# Patient Record
Sex: MALE | Race: WHITE | NOT HISPANIC OR LATINO | Employment: FULL TIME | URBAN - METROPOLITAN AREA
[De-identification: names, ages, dates, MRNs, and addresses within clinical notes are randomized per-mention and may not be internally consistent; named-entity substitution may affect disease eponyms.]

---

## 2024-06-21 ENCOUNTER — APPOINTMENT (OUTPATIENT)
Dept: CARDIOLOGY | Facility: CLINIC | Age: 49
End: 2024-06-21
Payer: COMMERCIAL

## 2024-06-21 VITALS
BODY MASS INDEX: 26.85 KG/M2 | SYSTOLIC BLOOD PRESSURE: 118 MMHG | HEIGHT: 74 IN | DIASTOLIC BLOOD PRESSURE: 72 MMHG | HEART RATE: 54 BPM | WEIGHT: 209.2 LBS | TEMPERATURE: 97.9 F

## 2024-06-21 DIAGNOSIS — I48.0 PAROXYSMAL ATRIAL FIBRILLATION (MULTI): Primary | ICD-10-CM

## 2024-06-21 PROCEDURE — 99213 OFFICE O/P EST LOW 20 MIN: CPT | Performed by: INTERNAL MEDICINE

## 2024-06-21 PROCEDURE — 1036F TOBACCO NON-USER: CPT | Performed by: INTERNAL MEDICINE

## 2024-06-21 NOTE — PROGRESS NOTES
"  Subjective:  The patient is a 48-year-old white male, followed primarily by Dr. Jessica Butcher, who presents for follow-up of paroxysmal atrial fibrillation.  He has been in good health, with no hypertension, diabetes, or known coronary disease.  He wore a Holter monitor in September 2022 showing up to 16 beats of atrial tachycardia but no true fibrillation.  He wears a Fitbit, which reports that he can have heart rates up to 169 bpm while sleeping.  I had him wear a 2-week event monitor in March 2023, showing some definite runs of atrial fibrillation, but never lasting more than 31 seconds in duration.  Over the past year, he feels that these have become more frequent, and sometimes cause lightheadedness.  He is anxious to have the problem treated definitively if possible.  He is not on any AV louie blocking drugs, anticoagulant therapy, or antiarrhythmic therapy.    The patient's history is also remarkable for borderline hyperlipidemia and prior gout.  He works in information technology as a consultant, and is physically active, jogging several days weekly.  He has 2 children, ages 12 and 11, and is a .  He has a girlfriend presently but does not plan to get remarried.     Allergies:  Patient has no known allergies.      Objective:  Vitals:    06/21/24 1128   BP: 118/72   Pulse: 54   Temp: 36.6 °C (97.9 °F)   Height:     1.88 m (6' 2\")  Weight: 94.9 kg (209 lb 3.2 oz)     Exam:  Gen: Pleasant gentleman in no distress; alert and oriented.  HEENT: No scleral icterus; wearing glasses.  Neck: No jugular venous distention or thyromegaly.  Lungs: Clear to auscultation, with no wheezes or rales.  Heart: Regular rhythm without extrasystoles, murmurs or gallops.  Abdomen: Benign, with no organomegaly or masses.  Extremities: Intact distal pulses; no edema.  Neuro: No focal neurologic abnormalities.  Skin: No cutaneous lesions.    Impressions:  1.  Paroxysmal atrial fibrillation, well-documented by event " monitoring in March 2023, though of uncertain etiology.  It is possible that he has vaguely-induced atrial fibrillation from his aerobic activity such as regular jogging.  He does not wish to be on long-term pharmacologic therapy for this problem if possible.  2.  Borderline hyperlipidemia, on dietary management.  3.  Remote gout.    Recommendations:  1.  The patient will be referred to Dr. Ollie Leroy for consideration of pulmonary vein isolation, as he is an excellent candidate for this procedure.  2.  The patient is aware that he will need to be anticoagulated with a drug such as Eliquis for 3 to 4 weeks prior to his ablation, and I could provide drug samples from the office if he desires.  3.  If he undergoes PVI, he could likely be weaned off Eliquis fairly quickly and then undergo another event monitor several months thereafter.      Gregory Velez MD

## 2024-07-03 PROBLEM — R00.2 PALPITATIONS: Status: ACTIVE | Noted: 2023-03-15

## 2024-07-03 RX ORDER — INDOMETHACIN 50 MG/1
50 CAPSULE ORAL
COMMUNITY
Start: 2023-09-27

## 2024-07-05 ENCOUNTER — OFFICE VISIT (OUTPATIENT)
Dept: CARDIOLOGY | Facility: HOSPITAL | Age: 49
End: 2024-07-05
Payer: COMMERCIAL

## 2024-07-05 VITALS
HEIGHT: 75 IN | BODY MASS INDEX: 25.61 KG/M2 | DIASTOLIC BLOOD PRESSURE: 72 MMHG | SYSTOLIC BLOOD PRESSURE: 105 MMHG | WEIGHT: 206 LBS | HEART RATE: 100 BPM | OXYGEN SATURATION: 98 %

## 2024-07-05 DIAGNOSIS — I48.0 PAROXYSMAL ATRIAL FIBRILLATION (MULTI): ICD-10-CM

## 2024-07-05 PROCEDURE — 1036F TOBACCO NON-USER: CPT | Performed by: INTERNAL MEDICINE

## 2024-07-05 PROCEDURE — 93005 ELECTROCARDIOGRAM TRACING: CPT | Performed by: INTERNAL MEDICINE

## 2024-07-05 PROCEDURE — 99214 OFFICE O/P EST MOD 30 MIN: CPT | Performed by: INTERNAL MEDICINE

## 2024-07-05 PROCEDURE — 99204 OFFICE O/P NEW MOD 45 MIN: CPT | Performed by: INTERNAL MEDICINE

## 2024-07-05 ASSESSMENT — ENCOUNTER SYMPTOMS
LOSS OF SENSATION IN FEET: 0
DEPRESSION: 0
OCCASIONAL FEELINGS OF UNSTEADINESS: 0

## 2024-07-05 ASSESSMENT — PAIN SCALES - GENERAL: PAINLEVEL: 0-NO PAIN

## 2024-07-05 ASSESSMENT — COLUMBIA-SUICIDE SEVERITY RATING SCALE - C-SSRS
2. HAVE YOU ACTUALLY HAD ANY THOUGHTS OF KILLING YOURSELF?: NO
6. HAVE YOU EVER DONE ANYTHING, STARTED TO DO ANYTHING, OR PREPARED TO DO ANYTHING TO END YOUR LIFE?: NO
1. IN THE PAST MONTH, HAVE YOU WISHED YOU WERE DEAD OR WISHED YOU COULD GO TO SLEEP AND NOT WAKE UP?: NO

## 2024-07-05 ASSESSMENT — PATIENT HEALTH QUESTIONNAIRE - PHQ9
2. FEELING DOWN, DEPRESSED OR HOPELESS: NOT AT ALL
1. LITTLE INTEREST OR PLEASURE IN DOING THINGS: NOT AT ALL
SUM OF ALL RESPONSES TO PHQ9 QUESTIONS 1 AND 2: 0

## 2024-07-05 NOTE — PROGRESS NOTES
Referring Provider: Gregory Velez MD  Reason for Consult: Atrial fibrillation    History of Present Illness:      Walt Menon is a 48 y.o. year old male patient with a history significant for paroxysmal atrial fibrillation who is referred by Dr. Velez for consideration for A-fib ablation.    He has no other known medical history.  He was originally diagnosed with atrial fibrillation in 2023.  He has been having palpitations since the 90s, however these episodes were always brief and self-limited.  In 2022, he had a Holter monitor that showed up to 16 beats of atrial tachycardia but no true atrial fibrillation.  However, on his Fitbit, he had episodes where his heart rates went up to 169 bpm especially while sleeping.  In March 2023 he had a 2-week event monitor which showed some runs of atrial fibrillation but nothing more than 31 seconds in duration.  These have become progressively more frequent.  More recently, he has been going into atrial fibrillation, which usually last for about 24 hours until it spontaneously terminates.  He is quite symptomatic when he is in atrial fibrillation, noting palpitations, as well as decreased exercise tolerance.  He has noted that some triggers include hot sauce and pasta.  He rarely drinks alcohol, about once a week.  He is not currently on any medication.  He is interested in A-fib ablation.      Focused Cardiovascular Problem List:  Paroxysmal atrial fibrillation: [id number] equals 0.      Past Medical and Surgical History:  Mr. Menon  has no past medical history on file.    has no past surgical history on file.    Social History:  Social History     Tobacco Use    Smoking status: Never    Smokeless tobacco: Never   Substance Use Topics    Alcohol use: Yes      Tobacco: Denies  Alcohol: Social  Drug use:   Denies  Occupational History: Works in IT  Other: Lives at lives at home with children.  .    Relevant Family History:   No  "relevant family history     Allergies:  No Known Allergies     Medications:  Current Outpatient Medications   Medication Instructions    indomethacin (INDOCIN) 50 mg, oral    rivaroxaban (XARELTO) 20 mg, oral, Daily         Objective   Physical Exam:  Last Recorded Vitals:      3/3/2023    10:20 AM 6/21/2024    11:28 AM 7/5/2024    10:02 AM   Vitals   Systolic 115 118 105   Diastolic 63 72 72   Heart Rate 58 54 100   Temp 37 °C (98.6 °F) 36.6 °C (97.9 °F)    Height (in) 1.88 m (6' 2\") 1.88 m (6' 2\") 1.905 m (6' 3\")   Weight (lb) 203 209.2 206   BMI 26.06 kg/m2 26.86 kg/m2 25.75 kg/m2   BSA (m2) 2.19 m2 2.23 m2 2.22 m2   Visit Report  Report Report    Visit Vitals  /72 (BP Location: Left arm, Patient Position: Sitting)   Pulse 100   Ht 1.905 m (6' 3\")   Wt 93.4 kg (206 lb)   SpO2 98%   BMI 25.75 kg/m²   Smoking Status Never   BSA 2.22 m²      Gen: NAD, sitting comfortably  HEENT: NC/AT  Card: Irregularly irregular, no murmurs rubs or gallops  Pulm: Clear to auscultation bilaterally  Ext: No LE edema  Neuro: No focal deficits    Diagnostic Results      My Interpretation of Reviewed Study(s):  Prior ECGs (reviewed and my interpretation):   7/5/2024: Atrial fibrillation, heart rate 99 bpm, aberrantly conducted complexes      Echocardiography:  7/5/2024: Normal LV ejection fraction, normal left atrial size, normal right atrial size      Relevant Labs:  No results found for: \"CREATININE\", \"CCL\", \"K\", \"HGBA1C\", \"HGB\", \"INR\", \"AST\", \"ALT\"    Assessment/Plan   Assessment and Plan:  In summary, patient is a pleasant 48 y.o.male with a history of recurrent symptomatic paroxysmal atrial fibrillation diagnosed in 2023, has been maintained off medication, who presents today for initial consultation. Symptoms include palpitations and inability to exercise when in atrial fibrillation. Treatment options of atrial fibrillation were discussed with the patient and all questions were answered. These options included: 1. Rate " control drug therapy with AVN blockers to slow down the heart rate, 2. Rhythm control with anti-arrhythmic drugs and with cardioversion to restore normal sinus rhythm, and/or 3. Radiofrequency (RF) ablation. In particular, RF ablation of atrial fibrillation was discussed in detail.    We discussed the potential benefits of AF ablation including freedom from AF without any medical therapy or, in some cases, a significant improvement in AF burden on medical therapy. We also discussed that AF ablation has been shown to be the most effective way to maintain a normal rhythm. Normal rhythm is associated with less heart failure hospitalizations, development of congestive heart failure, major adverse cardiac events, and cognitive dysfunction.  Furthermore, early treatment of atrial fibrillation with ablation prior to structural changes in the heart can prevent future progression to persistent atrial fibrillation and further cardiac remodeling.    The success rate for catheter ablation for AF, meaning no recurrence of any atrial fibrillation, is currently approximately 70-80% for paroxysmal atrial fibrillation. A minority of patients may require an additional touch-up procedure. However, it is highly successful at reducing overall A-fib burden as well as symptomatic recurrences. The risks of the procedure were also extensively discussed, including but not limited to infection, blood vessel damage, heart injury or perforation necessitating emergency cardiac surgery, heart attack or stroke, atrial-esophageal fistula, pulmonary vein stenosis, phrenic nerve injury, even death.      Finally, the risk of stroke associated with atrial fibrillation was discussed - as the patient has a [id number] score of   0, the patient will be maintained on Xarelto for 1 month prior and 3 months after ablation for CVA prevention.  After 3 months, Xarelto can be discontinued.  If Xarelto is too expensive based on insurance, we can try Eliquis as  well.    After extensive discussion, Mr. Menon wishes to proceed with AF ablation.    Name: Walt Menon  MRN: [id number]  Attending: Ollie Leroy MD  Procedure: AF ablation  Date desired: 8/12/2024  Diagnosis: Paroxysmal atrial fibrillation  Vendor if applicable: [employer]  Expected anesthesia: General  Isolation/precautions?:  None  Other comments/med instructions: Continue anticoagulation including on the day of the procedure.        Return to Clinic:  After ablation    Thank you very much for allowing me to participate in the care of this patient. Please do not hesitate to contact me with any further questions or concerns.    Ollie Leroy MD  Clinical Cardiac Electrophysiologist, [facility] [place]    of Medicine, [facility]  Director of Atrial Fibrillation Ablation, [facility]  Director of Ventricular Arrhythmias Research, [facility]  Office Phone Number: [phone]

## 2024-07-08 LAB
ATRIAL RATE: 394 BPM
Q ONSET: 214 MS
QRS COUNT: 16 BEATS
QRS DURATION: 90 MS
QT INTERVAL: 340 MS
QTC CALCULATION(BAZETT): 436 MS
QTC FREDERICIA: 401 MS
R AXIS: 44 DEGREES
T AXIS: 36 DEGREES
T OFFSET: 384 MS
VENTRICULAR RATE: 99 BPM

## 2024-08-12 ENCOUNTER — HOSPITAL ENCOUNTER (OUTPATIENT)
Facility: HOSPITAL | Age: 49
Setting detail: OUTPATIENT SURGERY
Discharge: HOME | End: 2024-08-12
Attending: INTERNAL MEDICINE | Admitting: INTERNAL MEDICINE
Payer: COMMERCIAL

## 2024-08-12 ENCOUNTER — APPOINTMENT (OUTPATIENT)
Dept: CARDIOLOGY | Facility: HOSPITAL | Age: 49
End: 2024-08-12
Payer: COMMERCIAL

## 2024-08-12 ENCOUNTER — ANESTHESIA (OUTPATIENT)
Dept: CARDIOLOGY | Facility: HOSPITAL | Age: 49
End: 2024-08-12
Payer: COMMERCIAL

## 2024-08-12 ENCOUNTER — ANESTHESIA EVENT (OUTPATIENT)
Dept: CARDIOLOGY | Facility: HOSPITAL | Age: 49
End: 2024-08-12
Payer: COMMERCIAL

## 2024-08-12 ENCOUNTER — HOSPITAL ENCOUNTER (OUTPATIENT)
Dept: CARDIOLOGY | Facility: HOSPITAL | Age: 49
Setting detail: OUTPATIENT SURGERY
Discharge: HOME | End: 2024-08-12
Payer: COMMERCIAL

## 2024-08-12 VITALS
WEIGHT: 216.05 LBS | BODY MASS INDEX: 27.73 KG/M2 | TEMPERATURE: 96.8 F | SYSTOLIC BLOOD PRESSURE: 113 MMHG | HEIGHT: 74 IN | DIASTOLIC BLOOD PRESSURE: 69 MMHG | OXYGEN SATURATION: 98 % | HEART RATE: 66 BPM | RESPIRATION RATE: 22 BRPM

## 2024-08-12 DIAGNOSIS — I48.0 PAROXYSMAL ATRIAL FIBRILLATION (MULTI): Primary | ICD-10-CM

## 2024-08-12 LAB
ABO GROUP (TYPE) IN BLOOD: NORMAL
ABO GROUP (TYPE) IN BLOOD: NORMAL
ACT BLD: 163 SEC (ref 89–169)
ACT BLD: 389 SEC (ref 89–169)
ALBUMIN SERPL BCP-MCNC: 4.1 G/DL (ref 3.4–5)
ALP SERPL-CCNC: 40 U/L (ref 33–120)
ALT SERPL W P-5'-P-CCNC: 34 U/L (ref 10–52)
ANION GAP SERPL CALC-SCNC: 9 MMOL/L (ref 10–20)
ANTIBODY SCREEN: NORMAL
APTT PPP: 40 SECONDS (ref 27–38)
AST SERPL W P-5'-P-CCNC: 25 U/L (ref 9–39)
ATRIAL RATE: 52 BPM
ATRIAL RATE: 59 BPM
BILIRUB SERPL-MCNC: 0.5 MG/DL (ref 0–1.2)
BUN SERPL-MCNC: 18 MG/DL (ref 6–23)
CALCIUM SERPL-MCNC: 8.7 MG/DL (ref 8.6–10.3)
CHLORIDE SERPL-SCNC: 109 MMOL/L (ref 98–107)
CO2 SERPL-SCNC: 25 MMOL/L (ref 21–32)
CREAT SERPL-MCNC: 0.91 MG/DL (ref 0.5–1.3)
EGFRCR SERPLBLD CKD-EPI 2021: >90 ML/MIN/1.73M*2
ERYTHROCYTE [DISTWIDTH] IN BLOOD BY AUTOMATED COUNT: 12.9 % (ref 11.5–14.5)
GLUCOSE SERPL-MCNC: 89 MG/DL (ref 74–99)
HCT VFR BLD AUTO: 44.8 % (ref 41–52)
HGB BLD-MCNC: 15.2 G/DL (ref 13.5–17.5)
INR PPP: 1.2 (ref 0.9–1.1)
MCH RBC QN AUTO: 30.4 PG (ref 26–34)
MCHC RBC AUTO-ENTMCNC: 33.9 G/DL (ref 32–36)
MCV RBC AUTO: 90 FL (ref 80–100)
NRBC BLD-RTO: 0 /100 WBCS (ref 0–0)
P AXIS: 44 DEGREES
P AXIS: 61 DEGREES
P OFFSET: 209 MS
P OFFSET: 215 MS
P ONSET: 148 MS
P ONSET: 155 MS
PLATELET # BLD AUTO: 201 X10*3/UL (ref 150–450)
POTASSIUM SERPL-SCNC: 3.9 MMOL/L (ref 3.5–5.3)
PR INTERVAL: 138 MS
PR INTERVAL: 154 MS
PROT SERPL-MCNC: 6.7 G/DL (ref 6.4–8.2)
PROTHROMBIN TIME: 13.9 SECONDS (ref 9.8–12.8)
Q ONSET: 224 MS
Q ONSET: 225 MS
QRS COUNT: 10 BEATS
QRS COUNT: 8 BEATS
QRS DURATION: 94 MS
QRS DURATION: 98 MS
QT INTERVAL: 436 MS
QT INTERVAL: 452 MS
QTC CALCULATION(BAZETT): 420 MS
QTC CALCULATION(BAZETT): 431 MS
QTC FREDERICIA: 430 MS
QTC FREDERICIA: 433 MS
R AXIS: 20 DEGREES
R AXIS: 23 DEGREES
RBC # BLD AUTO: 5 X10*6/UL (ref 4.5–5.9)
RH FACTOR (ANTIGEN D): NORMAL
RH FACTOR (ANTIGEN D): NORMAL
SODIUM SERPL-SCNC: 139 MMOL/L (ref 136–145)
T AXIS: 21 DEGREES
T AXIS: 31 DEGREES
T OFFSET: 442 MS
T OFFSET: 451 MS
VENTRICULAR RATE: 52 BPM
VENTRICULAR RATE: 59 BPM
WBC # BLD AUTO: 6.7 X10*3/UL (ref 4.4–11.3)

## 2024-08-12 PROCEDURE — 2500000004 HC RX 250 GENERAL PHARMACY W/ HCPCS (ALT 636 FOR OP/ED): Performed by: ANESTHESIOLOGY

## 2024-08-12 PROCEDURE — G0269 OCCLUSIVE DEVICE IN VEIN ART: HCPCS | Performed by: INTERNAL MEDICINE

## 2024-08-12 PROCEDURE — 7100000010 HC PHASE TWO TIME - EACH INCREMENTAL 1 MINUTE: Performed by: INTERNAL MEDICINE

## 2024-08-12 PROCEDURE — C1769 GUIDE WIRE: HCPCS | Performed by: INTERNAL MEDICINE

## 2024-08-12 PROCEDURE — 2780000003 HC OR 278 NO HCPCS: Performed by: INTERNAL MEDICINE

## 2024-08-12 PROCEDURE — 93656 COMPRE EP EVAL ABLTJ ATR FIB: CPT | Performed by: INTERNAL MEDICINE

## 2024-08-12 PROCEDURE — C1732 CATH, EP, DIAG/ABL, 3D/VECT: HCPCS | Performed by: INTERNAL MEDICINE

## 2024-08-12 PROCEDURE — 7100000009 HC PHASE TWO TIME - INITIAL BASE CHARGE: Performed by: INTERNAL MEDICINE

## 2024-08-12 PROCEDURE — 86901 BLOOD TYPING SEROLOGIC RH(D): CPT | Performed by: NURSE PRACTITIONER

## 2024-08-12 PROCEDURE — C1730 CATH, EP, 19 OR FEW ELECT: HCPCS | Performed by: INTERNAL MEDICINE

## 2024-08-12 PROCEDURE — C1760 CLOSURE DEV, VASC: HCPCS | Performed by: INTERNAL MEDICINE

## 2024-08-12 PROCEDURE — 85610 PROTHROMBIN TIME: CPT | Performed by: NURSE PRACTITIONER

## 2024-08-12 PROCEDURE — 3700000001 HC GENERAL ANESTHESIA TIME - INITIAL BASE CHARGE: Performed by: INTERNAL MEDICINE

## 2024-08-12 PROCEDURE — 2720000007 HC OR 272 NO HCPCS: Performed by: INTERNAL MEDICINE

## 2024-08-12 PROCEDURE — 80053 COMPREHEN METABOLIC PANEL: CPT | Performed by: NURSE PRACTITIONER

## 2024-08-12 PROCEDURE — 85027 COMPLETE CBC AUTOMATED: CPT | Performed by: NURSE PRACTITIONER

## 2024-08-12 PROCEDURE — 93010 ELECTROCARDIOGRAM REPORT: CPT | Performed by: INTERNAL MEDICINE

## 2024-08-12 PROCEDURE — 2500000005 HC RX 250 GENERAL PHARMACY W/O HCPCS: Performed by: ANESTHESIOLOGY

## 2024-08-12 PROCEDURE — C1766 INTRO/SHEATH,STRBLE,NON-PEEL: HCPCS | Performed by: INTERNAL MEDICINE

## 2024-08-12 PROCEDURE — 3700000002 HC GENERAL ANESTHESIA TIME - EACH INCREMENTAL 1 MINUTE: Performed by: INTERNAL MEDICINE

## 2024-08-12 PROCEDURE — 36415 COLL VENOUS BLD VENIPUNCTURE: CPT | Performed by: INTERNAL MEDICINE

## 2024-08-12 PROCEDURE — 85347 COAGULATION TIME ACTIVATED: CPT | Performed by: INTERNAL MEDICINE

## 2024-08-12 PROCEDURE — 86923 COMPATIBILITY TEST ELECTRIC: CPT

## 2024-08-12 PROCEDURE — 85347 COAGULATION TIME ACTIVATED: CPT

## 2024-08-12 PROCEDURE — 76937 US GUIDE VASCULAR ACCESS: CPT | Performed by: INTERNAL MEDICINE

## 2024-08-12 PROCEDURE — 2500000004 HC RX 250 GENERAL PHARMACY W/ HCPCS (ALT 636 FOR OP/ED): Performed by: INTERNAL MEDICINE

## 2024-08-12 PROCEDURE — 36415 COLL VENOUS BLD VENIPUNCTURE: CPT | Performed by: NURSE PRACTITIONER

## 2024-08-12 PROCEDURE — 93005 ELECTROCARDIOGRAM TRACING: CPT

## 2024-08-12 PROCEDURE — 93005 ELECTROCARDIOGRAM TRACING: CPT | Mod: 59

## 2024-08-12 PROCEDURE — 2500000005 HC RX 250 GENERAL PHARMACY W/O HCPCS: Performed by: INTERNAL MEDICINE

## 2024-08-12 PROCEDURE — C1759 CATH, INTRA ECHOCARDIOGRAPHY: HCPCS | Performed by: INTERNAL MEDICINE

## 2024-08-12 RX ORDER — SUCCINYLCHOLINE CHLORIDE 100 MG/5ML
SYRINGE (ML) INTRAVENOUS AS NEEDED
Status: DISCONTINUED | OUTPATIENT
Start: 2024-08-12 | End: 2024-08-12

## 2024-08-12 RX ORDER — ONDANSETRON HYDROCHLORIDE 2 MG/ML
INJECTION, SOLUTION INTRAVENOUS AS NEEDED
Status: DISCONTINUED | OUTPATIENT
Start: 2024-08-12 | End: 2024-08-12

## 2024-08-12 RX ORDER — LIDOCAINE HCL/PF 100 MG/5ML
SYRINGE (ML) INTRAVENOUS AS NEEDED
Status: DISCONTINUED | OUTPATIENT
Start: 2024-08-12 | End: 2024-08-12

## 2024-08-12 RX ORDER — ALBUTEROL SULFATE 0.83 MG/ML
2.5 SOLUTION RESPIRATORY (INHALATION) ONCE AS NEEDED
Status: DISCONTINUED | OUTPATIENT
Start: 2024-08-12 | End: 2024-08-12

## 2024-08-12 RX ORDER — HYDROMORPHONE HYDROCHLORIDE 1 MG/ML
0.5 INJECTION, SOLUTION INTRAMUSCULAR; INTRAVENOUS; SUBCUTANEOUS EVERY 5 MIN PRN
Status: DISCONTINUED | OUTPATIENT
Start: 2024-08-12 | End: 2024-08-12

## 2024-08-12 RX ORDER — PROTAMINE SULFATE 10 MG/ML
INJECTION, SOLUTION INTRAVENOUS AS NEEDED
Status: DISCONTINUED | OUTPATIENT
Start: 2024-08-12 | End: 2024-08-12

## 2024-08-12 RX ORDER — MEPERIDINE HYDROCHLORIDE 25 MG/ML
12.5 INJECTION INTRAMUSCULAR; INTRAVENOUS; SUBCUTANEOUS EVERY 10 MIN PRN
Status: DISCONTINUED | OUTPATIENT
Start: 2024-08-12 | End: 2024-08-12

## 2024-08-12 RX ORDER — SODIUM CHLORIDE, SODIUM LACTATE, POTASSIUM CHLORIDE, CALCIUM CHLORIDE 600; 310; 30; 20 MG/100ML; MG/100ML; MG/100ML; MG/100ML
100 INJECTION, SOLUTION INTRAVENOUS CONTINUOUS
Status: DISCONTINUED | OUTPATIENT
Start: 2024-08-12 | End: 2024-08-12

## 2024-08-12 RX ORDER — DIPHENHYDRAMINE HYDROCHLORIDE 50 MG/ML
12.5 INJECTION INTRAMUSCULAR; INTRAVENOUS ONCE AS NEEDED
Status: DISCONTINUED | OUTPATIENT
Start: 2024-08-12 | End: 2024-08-12

## 2024-08-12 RX ORDER — HEPARIN SODIUM 1000 [USP'U]/ML
INJECTION, SOLUTION INTRAVENOUS; SUBCUTANEOUS AS NEEDED
Status: DISCONTINUED | OUTPATIENT
Start: 2024-08-12 | End: 2024-08-12

## 2024-08-12 RX ORDER — ROCURONIUM BROMIDE 10 MG/ML
INJECTION, SOLUTION INTRAVENOUS AS NEEDED
Status: DISCONTINUED | OUTPATIENT
Start: 2024-08-12 | End: 2024-08-12

## 2024-08-12 RX ORDER — PANTOPRAZOLE SODIUM 40 MG/1
40 TABLET, DELAYED RELEASE ORAL
Status: DISCONTINUED | OUTPATIENT
Start: 2024-08-12 | End: 2024-08-12 | Stop reason: HOSPADM

## 2024-08-12 RX ORDER — LIDOCAINE HYDROCHLORIDE 10 MG/ML
0.1 INJECTION INFILTRATION; PERINEURAL ONCE
Status: DISCONTINUED | OUTPATIENT
Start: 2024-08-12 | End: 2024-08-12

## 2024-08-12 RX ORDER — ONDANSETRON HYDROCHLORIDE 2 MG/ML
4 INJECTION, SOLUTION INTRAVENOUS ONCE AS NEEDED
Status: DISCONTINUED | OUTPATIENT
Start: 2024-08-12 | End: 2024-08-12

## 2024-08-12 RX ORDER — PANTOPRAZOLE SODIUM 40 MG/1
40 TABLET, DELAYED RELEASE ORAL
Qty: 84 TABLET | Refills: 0 | Status: SHIPPED | OUTPATIENT
Start: 2024-08-12 | End: 2024-09-23

## 2024-08-12 RX ORDER — FENTANYL CITRATE 50 UG/ML
INJECTION, SOLUTION INTRAMUSCULAR; INTRAVENOUS AS NEEDED
Status: DISCONTINUED | OUTPATIENT
Start: 2024-08-12 | End: 2024-08-12

## 2024-08-12 RX ORDER — LIDOCAINE HYDROCHLORIDE 10 MG/ML
INJECTION, SOLUTION EPIDURAL; INFILTRATION; INTRACAUDAL; PERINEURAL AS NEEDED
Status: DISCONTINUED | OUTPATIENT
Start: 2024-08-12 | End: 2024-08-12 | Stop reason: HOSPADM

## 2024-08-12 RX ORDER — OXYCODONE HYDROCHLORIDE 5 MG/1
5 TABLET ORAL EVERY 4 HOURS PRN
Status: CANCELLED | OUTPATIENT
Start: 2024-08-12

## 2024-08-12 RX ORDER — FENTANYL CITRATE 50 UG/ML
25 INJECTION, SOLUTION INTRAMUSCULAR; INTRAVENOUS EVERY 5 MIN PRN
Status: DISCONTINUED | OUTPATIENT
Start: 2024-08-12 | End: 2024-08-12

## 2024-08-12 RX ORDER — ACETAMINOPHEN 325 MG/1
650 TABLET ORAL EVERY 4 HOURS PRN
Status: CANCELLED | OUTPATIENT
Start: 2024-08-12

## 2024-08-12 RX ORDER — OXYCODONE HYDROCHLORIDE 5 MG/1
10 TABLET ORAL EVERY 4 HOURS PRN
Status: CANCELLED | OUTPATIENT
Start: 2024-08-12

## 2024-08-12 RX ORDER — PROPOFOL 10 MG/ML
INJECTION, EMULSION INTRAVENOUS AS NEEDED
Status: DISCONTINUED | OUTPATIENT
Start: 2024-08-12 | End: 2024-08-12

## 2024-08-12 RX ORDER — IBUPROFEN 400 MG/1
400 TABLET ORAL EVERY 8 HOURS PRN
Status: DISCONTINUED | OUTPATIENT
Start: 2024-08-12 | End: 2024-08-12 | Stop reason: HOSPADM

## 2024-08-12 RX ORDER — METOPROLOL TARTRATE 1 MG/ML
5 INJECTION, SOLUTION INTRAVENOUS ONCE
Status: DISCONTINUED | OUTPATIENT
Start: 2024-08-12 | End: 2024-08-12

## 2024-08-12 RX ORDER — MIDAZOLAM HYDROCHLORIDE 1 MG/ML
INJECTION, SOLUTION INTRAMUSCULAR; INTRAVENOUS AS NEEDED
Status: DISCONTINUED | OUTPATIENT
Start: 2024-08-12 | End: 2024-08-12

## 2024-08-12 RX ORDER — ACETAMINOPHEN 325 MG/1
650 TABLET ORAL EVERY 4 HOURS PRN
Status: DISCONTINUED | OUTPATIENT
Start: 2024-08-12 | End: 2024-08-12 | Stop reason: HOSPADM

## 2024-08-12 SDOH — HEALTH STABILITY: MENTAL HEALTH: CURRENT SMOKER: 0

## 2024-08-12 ASSESSMENT — PAIN SCALES - GENERAL

## 2024-08-12 ASSESSMENT — PAIN - FUNCTIONAL ASSESSMENT

## 2024-08-12 ASSESSMENT — COLUMBIA-SUICIDE SEVERITY RATING SCALE - C-SSRS
1. IN THE PAST MONTH, HAVE YOU WISHED YOU WERE DEAD OR WISHED YOU COULD GO TO SLEEP AND NOT WAKE UP?: NO
6. HAVE YOU EVER DONE ANYTHING, STARTED TO DO ANYTHING, OR PREPARED TO DO ANYTHING TO END YOUR LIFE?: NO
2. HAVE YOU ACTUALLY HAD ANY THOUGHTS OF KILLING YOURSELF?: NO

## 2024-08-12 NOTE — ANESTHESIA POSTPROCEDURE EVALUATION
Patient: Walt Menon    Procedure Summary       Date: 08/12/24 Room / Location: [facility] / [facility]    Anesthesia Start: 0745 Anesthesia Stop: 0945    Procedure: Ablation A-Fib Diagnosis:       Paroxysmal atrial fibrillation (Multi)      (Paroxysmal atrial fibrillation (Multi) [I48.0])    Providers: Ollie Leroy MD Responsible Provider: Tara Jeff MD    Anesthesia Type: general ASA Status: 3            Anesthesia Type: general    Vitals Value Taken Time   /68 08/12/24 0944   Temp 36.0 08/12/24 0946   Pulse 70 08/12/24 0944   Resp 12 08/12/24 0944   SpO2 100 % 08/12/24 0944   Vitals shown include unfiled device data.    Anesthesia Post Evaluation    Patient location during evaluation: PACU  Patient participation: complete - patient participated  Level of consciousness: sleepy but conscious  Pain management: adequate  Airway patency: patent  Cardiovascular status: acceptable, blood pressure returned to baseline and hemodynamically stable  Respiratory status: acceptable, nonlabored ventilation, spontaneous ventilation, face mask and unassisted  Hydration status: acceptable  Postoperative Nausea and Vomiting: none      There were no known notable events for this encounter.

## 2024-08-12 NOTE — POST-PROCEDURE NOTE
Physician Transition of Care Summary  Invasive Cardiovascular Lab    Procedure Date: 8/12/2024  Attending:    * Ollie Leroy - Primary  Resident/Fellow/Other Assistant: Surgeons and Role:  * No surgeons found with a matching role *    Indications:   Pre-op Diagnosis      * Paroxysmal atrial fibrillation (Multi) [I48.0]    Post-procedure diagnosis:   Post-op Diagnosis     * Paroxysmal atrial fibrillation (Multi) [I48.0]    Procedure(s):   Ablation A-Fib  99599 - OK COMPRE EP EVAL ABLTJ ATR FIB PULM VEIN ISOLATION    Paroxysmal Atrial Fibrillation Ablation     Procedures  Pulmonary Vein Isolation (17413), LA Pacing and recording (99398), 3D Mapping (43773), Transeptal Catheterization (88041), His Bundle Recording (29775), Ultrasound Guided vascular access (41834), Intracardiac Echocardiogram (58648)  Direct current cardioversion (14145)     Patient history:  Please see attached H&P     Procedure narrative:  The risks, benefits, and alternatives to the procedure were explained to the patient and informed consent was obtained. After informed written consent was obtained the patient was transported to the electrophysiology laboratory in the post absorptive, non-sedated state. The team verification process was completed prior to the start of the procedure. Written consent and a completed procedural sedation form were confirmed. Allergies/Adverse reactions were noted and patient teaching was performed. The patient was positioned on table and bilateral arm supports with soft cushions and all pressure points were padded. A large diameter grounding pad was applied to the patient's thigh. ECG electrodes and a set of hands-free defibrillator pads were applied to the patient. A MCL/12 lead ECG was continuously monitored throughout the procedure. Noninvasive blood pressure, oxygen saturation, and capnography were monitored throughout the procedure. Supplemental oxygen was delivered via nasal cannula or facemask. General  anesthesia was administered throughout the procedure by our staff anesthesiology service.  Please see their notations for intubation and sedation.  Throughout the procedure the patient's comfort (pain scale) and level of sedation (sedation scale) were noted every 5 minutes.         VENOUS ACCESS: After general anesthesia, femoral access was achieved utilizing the Seldinger technique with a cook needle under ultrasound guidance. One 8 FR sheath and one 9Fr sheath were placed in the right femoral veins. Via the right femoral vein 9Fr short sheath an ICE catheter was advanced into the mid RA for continuous ultrasound guidance and into the RVOT to evaluate for an effusion.  After access was obtained, a bolus injection of weight based heparin 150 units/kg was administered. The Activated Clotting Time maintained between 350-400 secs with additional boluses q 30 minutes as necessary.     TRANSSEPTAL ACCESS: A transseptal atrial puncture was performed using intracardiac ultrasound guidance. A long J-tip wire was passed through the superior right femoral vein 8 Fr sheath into the SVC, the 8Fr sheath was subsequently removed. A Vizigo sheath was advanced over wire into the SVC. The wire was removed and the BRK needle was advanced into the sheath. The Vizigo with the BRK needle were dragged from the SVC along the septum until engagement of the Fossa Ovalis was confirmed by interatrial tenting seen under intracardiac ultrasound guidance. The BRK needle was advanced into the left atrium  the needle position confirmed with ICE. A SafeSept wire was advanced into the LSPV. The sheath with dilator were advanced carefully over the needle into the body of the left atrium. Once the Vizigo sheath was confirmed in the left atrium via intracardiac ultrasound, the wire and dilator were removed.  The sheath was attached to pressure tubing and a 25cc/hr drip of heparinized saline maintained. The ICE catheter was exchanged for a decapolar CS  catheter if needed for pacing during the case.      MAPPING: Prior to transseptal, a right atrial shell was reconstructed using ablation catheter FAM. The His, phrenic nerve, and CS were identified using the ablation catheter on FAM. The ICE catheter was advanced into the RVOT. The epicardial space of the heart, including around the RV and LV, was evaluated and no effusion was noted. After transseptal access, a Pentaray catheter was advanced into the left atrium through the Vizigo sheath. A 3-D shell representing the left atrium and pulmonary veins was constructed by use of the [employer] Sound mapping system and catheter manipulation in the LA. The electroanatomic map revealed predominantly normal voltage indicating normal healthy myocardium in the left atrium. The mapping system was utilized to facilitate fluoroless manipulation of all catheters.     PULMONARY VEIN ISOLATION: Left atrial ablation was performed in the pulmonary vein antrum to encircle the right and left sided PVs. Wide area encirclement was performed.  After the PVI ablation was completed, both pulmonary veins were assessed for block with the ablation catheter. Both right and left pulmonary veins were found to have entrance and exit block. Radiofrequency energy was applied with maximal power of 50W and target force of 10 grams or greater. Over the posterior wall radiofrequency energy was applied with maximal power of 50W with target force of 10 grams. Ablation was continued until a CARTO SurePoint index between 400-500 was achieved. Along the posterior wall a Carto SurePoint index between 300-350 was targeted. Esophageal temperature probe was taped to a quad catheter (4Fr Maria De Jesus) so it could be visualized on CARTO, and it was maintained as close as possible to the lesion sites on the posterior wall and RF was terminated for any temperature rise greater than 1°C. Baseline temperature was 35.5C at the start of the case and increased to a peak  temperature of 36.8C . In addition, phrenic nerve location (using high output pacing) was tagged and localized on Carto over the right and left atrium (RSPV). No ablation was performed at the vicinity of the phrenic nerve. First pass isolation was achieved in the left veins, but not the right. A carinal line was performed in the right veins with subsequent isolation. Both entrance and exit block were demonstrated in the PV's.     PROVOCATION POST-PULMONARY VEIN ISOLATION: After re-confirming entrance and exit block from both pulmonary veins, rapid atrial pacing was performed from the left atrial posterior wall. No flutter was induced. Atrial fibrillation was induced, which was cardioverted with one 200J synchronized shock.      POST-ABLATION: The ICE catheter was maneuvered into the RVOT. The epicardial space of the heart, including around the RV and LV, was evaluated and no effusion was visualized. Sheaths were removed and hemostasis was obtained at the right femoral venous access sites with Perclose. Five minutes of manual compression was applied to maintain hemostasis.       Complications:  No complications occurred     Summary:  - Successful pulmonary vein isolation by radiofrequency ablation with demonstrated entrance block and exit block.         Discharge:   The patient left the EP laboratory in stable condition.   If pt doing well, groin access sites without issues pt can be discharged      Follow up:   Resume AC Xarelto 20mg Daily, Please DO NOT hold blood thinner unless emergency without asking your doctor.   PPI BID x 6 weeks  Bedrest for 2 hours post sheath removal  No driving, alcohol or making legal decisions for 24 hours.  Remove band-aid/tegaderm in 1 day.  No heavy lifting, strenuous exercise for 1 week  Please call our office if you notice any groin discharge or swelling or fever.   For cardiology electrophysiology emergencies (such as severe heart racing, bleeding, severe groin pain/swelling, high  fever, wound discharge, stroke symptoms, or recent severe chest pain) call the office     See complete procedural log and parameters.     ATTESTATION   As the responsible attending physician, I was present and directly participating in all critical portions of the procedure and immediately available during the non-critical portions.    Electronically signed by: Ollie Leroy MD, 8/12/2024 10:13 AM

## 2024-08-12 NOTE — ANESTHESIA PREPROCEDURE EVALUATION
Patient: Walt Menon    Procedure Information       Date/Time: 08/12/24 0730    Procedure: Ablation A-Fib - Labs on admit    Location: [facility] / [facility]    Providers: Ollie Leroy MD            Relevant Problems   Cardiac   (+) Paroxysmal atrial fibrillation (Multi)      GI   (+) Gastroesophageal reflux disease without esophagitis       Clinical information reviewed:   Tobacco  Allergies  Meds  Problems  Med Hx  Surg Hx   Fam Hx  Soc   Hx        NPO Detail:  NPO/Void Status  Date of Last Liquid: 08/11/24  Time of Last Liquid: 2000  Date of Last Solid: 08/11/24  Time of Last Solid: 2000  Last Intake Type: Clear fluids  Time of Last Void: 0500         Physical Exam    Airway  Mallampati: II  TM distance: >3 FB  Neck ROM: full     Cardiovascular   Rhythm: irregular     Dental    Pulmonary - normal exam     Abdominal - normal exam             Anesthesia Plan    History of general anesthesia?: yes  History of complications of general anesthesia?: no    ASA 3     general   (A-line)  The patient is not a current smoker.  Patient did not smoke on day of procedure.    intravenous induction   Anesthetic plan and risks discussed with patient.    Plan discussed with CRNA and attending.

## 2024-08-12 NOTE — SIGNIFICANT EVENT
Pt ambulated down morin to  and voided clear/yellow urine. Right groin site soft and stable with no hematoma or oozing. Pt denies dizziness/lightheadedness/pain.

## 2024-08-12 NOTE — SIGNIFICANT EVENT
Pt ambulating in hallway without difficulty, no complaints of lightheadedness or dizziness   Refill passed per [place] [facility], [facility] protocol    Requested Prescriptions   Pending Prescriptions Disp Refills    PANTOPRAZOLE 40 MG Oral Tab EC [Pharmacy Med Name: Pantoprazole Sodium Ec 40 Mg Tab Auro] 90 tablet 3     Sig: Take 1 tablet by mouth every morning before breakfast.       Gastrointestional Medication Protocol Passed - 3/19/2023  1:32 AM        Passed - In person appointment or virtual visit in the past 12 mos or appointment in next 3 mos     Recent Outpatient Visits              2 weeks ago Wellness examination    Venkat Russell MD    Office Visit    3 weeks ago Bilateral primary osteoarthritis of knee    Taya Lazar Tommas Noss, MD    Office Visit    5 years ago Sensorineural hearing loss, bilateral    Haydermily Recio, [address], Jeannette Murphy    Office Visit    5 years ago Ringing in the ear, right    Hayder Kalebjoann, [address], Paolo Clarke MD    Office Visit    5 years ago Anxiety state    Taya Lazar Jacquiline Pelt, [place]    Office Visit          Future Appointments         Provider Department Appt Notes    In 1 week [address] DEXA [address] Smetany [facility] for [facility]

## 2024-08-12 NOTE — Clinical Note
Sheath was exchanged in the right femoral vein with SHEATH, [employer], W/.038 GUIDEWIRE, 10 CM,  8FR INTRODUCER, 8FR JYOTHI, 2.5 CM DIALATOR.

## 2024-08-12 NOTE — ANESTHESIA PROCEDURE NOTES
Arterial Line:    Date/Time: 8/12/2024 7:19 AM    Staffing  Performed: attending   Authorized by: Tara Jeff MD    Performed by: Tara Jeff MD    An arterial line was placed. Procedure performed using ultrasound guidance and surface landmarks.in the pre-op for the following indication(s): continuous blood pressure monitoring and blood sampling needed.    A 20 gauge (size) (length), Angiocath (type) catheter was placed into the Left radial artery, secured by Tegaderm,   Seldinger technique used.  Events:  patient tolerated procedure well with no complications.

## 2024-08-12 NOTE — SIGNIFICANT EVENT
Discharge instructions given to pt including medications, follow up and wound care, pt verbalized understanding

## 2024-08-12 NOTE — Clinical Note
Sheath was exchanged in the right femoral vein with SHEATH, GUIDING, ADRIÁN, 8.5F WITH CURVE VIZ  MDC.

## 2024-08-12 NOTE — ANESTHESIA PROCEDURE NOTES
Airway  Date/Time: 8/12/2024 7:56 AM  Urgency: elective    Airway not difficult    Staffing  Performed: CRNA   Authorized by: Tara Jeff MD    Performed by: ALESIA Ortiz-CATRACHO  Patient location during procedure: OR    Indications and Patient Condition  Indications for airway management: anesthesia and airway protection  Spontaneous Ventilation: absent  Sedation level: deep  Preoxygenated: yes  Mask difficulty assessment: 1 - vent by mask    Final Airway Details  Final airway type: endotracheal airway      Successful airway: ETT  Cuffed: yes   Successful intubation technique: video laryngoscopy  Facilitating devices/methods: intubating stylet  Endotracheal tube insertion site: oral  Blade size: #4  ETT size (mm): 8.0  Cormack-Lehane Classification: grade I - full view of glottis  Placement verified by: chest auscultation and capnometry   Measured from: gums  ETT to gums (cm): 22  Number of attempts at approach: 1

## 2024-08-12 NOTE — SIGNIFICANT EVENT
After removing right forearm IV pt complained of some nausea, BP low, assisted back to bed, BP  came back up, notified NP, will monitor closely

## 2024-08-13 LAB
BLOOD EXPIRATION DATE: NORMAL
BLOOD EXPIRATION DATE: NORMAL
DISPENSE STATUS: NORMAL
DISPENSE STATUS: NORMAL
PRODUCT BLOOD TYPE: 6200
PRODUCT BLOOD TYPE: 6200
PRODUCT CODE: NORMAL
PRODUCT CODE: NORMAL
UNIT ABO: NORMAL
UNIT ABO: NORMAL
UNIT NUMBER: NORMAL
UNIT NUMBER: NORMAL
UNIT RH: NORMAL
UNIT RH: NORMAL
UNIT VOLUME: 350
UNIT VOLUME: 350
XM INTEP: NORMAL
XM INTEP: NORMAL

## 2024-08-14 ENCOUNTER — APPOINTMENT (OUTPATIENT)
Dept: CARDIOLOGY | Facility: HOSPITAL | Age: 49
End: 2024-08-14
Payer: COMMERCIAL

## 2024-08-14 ENCOUNTER — APPOINTMENT (OUTPATIENT)
Dept: RADIOLOGY | Facility: HOSPITAL | Age: 49
End: 2024-08-14
Payer: COMMERCIAL

## 2024-08-14 ENCOUNTER — HOSPITAL ENCOUNTER (EMERGENCY)
Facility: HOSPITAL | Age: 49
Discharge: HOME | End: 2024-08-14
Attending: EMERGENCY MEDICINE
Payer: COMMERCIAL

## 2024-08-14 VITALS
DIASTOLIC BLOOD PRESSURE: 79 MMHG | OXYGEN SATURATION: 99 % | HEART RATE: 80 BPM | HEIGHT: 74 IN | WEIGHT: 215 LBS | BODY MASS INDEX: 27.59 KG/M2 | RESPIRATION RATE: 17 BRPM | SYSTOLIC BLOOD PRESSURE: 132 MMHG | TEMPERATURE: 98.1 F

## 2024-08-14 DIAGNOSIS — R07.89 OTHER CHEST PAIN: Primary | ICD-10-CM

## 2024-08-14 LAB
ALBUMIN SERPL BCP-MCNC: 4.3 G/DL (ref 3.4–5)
ALP SERPL-CCNC: 42 U/L (ref 33–120)
ALT SERPL W P-5'-P-CCNC: 29 U/L (ref 10–52)
ANION GAP SERPL CALC-SCNC: 12 MMOL/L (ref 10–20)
AST SERPL W P-5'-P-CCNC: 27 U/L (ref 9–39)
BASOPHILS # BLD AUTO: 0.08 X10*3/UL (ref 0–0.1)
BASOPHILS NFR BLD AUTO: 0.5 %
BILIRUB SERPL-MCNC: 0.7 MG/DL (ref 0–1.2)
BNP SERPL-MCNC: 201 PG/ML (ref 0–99)
BUN SERPL-MCNC: 10 MG/DL (ref 6–23)
CALCIUM SERPL-MCNC: 9.1 MG/DL (ref 8.6–10.3)
CARDIAC TROPONIN I PNL SERPL HS: 760 NG/L (ref 0–20)
CARDIAC TROPONIN I PNL SERPL HS: 784 NG/L (ref 0–20)
CHLORIDE SERPL-SCNC: 105 MMOL/L (ref 98–107)
CO2 SERPL-SCNC: 27 MMOL/L (ref 21–32)
CREAT SERPL-MCNC: 1 MG/DL (ref 0.5–1.3)
D DIMER PPP FEU-MCNC: 267 NG/ML FEU
EGFRCR SERPLBLD CKD-EPI 2021: >90 ML/MIN/1.73M*2
EOSINOPHIL # BLD AUTO: 0.06 X10*3/UL (ref 0–0.7)
EOSINOPHIL NFR BLD AUTO: 0.4 %
ERYTHROCYTE [DISTWIDTH] IN BLOOD BY AUTOMATED COUNT: 13.3 % (ref 11.5–14.5)
GLUCOSE SERPL-MCNC: 83 MG/DL (ref 74–99)
HCT VFR BLD AUTO: 46 % (ref 41–52)
HGB BLD-MCNC: 15.2 G/DL (ref 13.5–17.5)
IMM GRANULOCYTES # BLD AUTO: 0.08 X10*3/UL (ref 0–0.7)
IMM GRANULOCYTES NFR BLD AUTO: 0.5 % (ref 0–0.9)
INR PPP: 1.3 (ref 0.9–1.1)
LYMPHOCYTES # BLD AUTO: 2.51 X10*3/UL (ref 1.2–4.8)
LYMPHOCYTES NFR BLD AUTO: 16.5 %
MCH RBC QN AUTO: 30.3 PG (ref 26–34)
MCHC RBC AUTO-ENTMCNC: 33 G/DL (ref 32–36)
MCV RBC AUTO: 92 FL (ref 80–100)
MONOCYTES # BLD AUTO: 1.56 X10*3/UL (ref 0.1–1)
MONOCYTES NFR BLD AUTO: 10.3 %
NEUTROPHILS # BLD AUTO: 10.9 X10*3/UL (ref 1.2–7.7)
NEUTROPHILS NFR BLD AUTO: 71.8 %
NRBC BLD-RTO: 0 /100 WBCS (ref 0–0)
PLATELET # BLD AUTO: 214 X10*3/UL (ref 150–450)
POTASSIUM SERPL-SCNC: 4 MMOL/L (ref 3.5–5.3)
PROT SERPL-MCNC: 7 G/DL (ref 6.4–8.2)
PROTHROMBIN TIME: 14.3 SECONDS (ref 9.8–12.8)
RBC # BLD AUTO: 5.01 X10*6/UL (ref 4.5–5.9)
SODIUM SERPL-SCNC: 140 MMOL/L (ref 136–145)
WBC # BLD AUTO: 15.2 X10*3/UL (ref 4.4–11.3)

## 2024-08-14 PROCEDURE — 93005 ELECTROCARDIOGRAM TRACING: CPT

## 2024-08-14 PROCEDURE — 84075 ASSAY ALKALINE PHOSPHATASE: CPT | Performed by: EMERGENCY MEDICINE

## 2024-08-14 PROCEDURE — 85025 COMPLETE CBC W/AUTO DIFF WBC: CPT | Performed by: EMERGENCY MEDICINE

## 2024-08-14 PROCEDURE — 83880 ASSAY OF NATRIURETIC PEPTIDE: CPT | Performed by: EMERGENCY MEDICINE

## 2024-08-14 PROCEDURE — 71275 CT ANGIOGRAPHY CHEST: CPT

## 2024-08-14 PROCEDURE — 84484 ASSAY OF TROPONIN QUANT: CPT | Performed by: EMERGENCY MEDICINE

## 2024-08-14 PROCEDURE — 71275 CT ANGIOGRAPHY CHEST: CPT | Mod: FOREIGN READ | Performed by: RADIOLOGY

## 2024-08-14 PROCEDURE — 36415 COLL VENOUS BLD VENIPUNCTURE: CPT | Performed by: EMERGENCY MEDICINE

## 2024-08-14 PROCEDURE — 71046 X-RAY EXAM CHEST 2 VIEWS: CPT

## 2024-08-14 PROCEDURE — 2550000001 HC RX 255 CONTRASTS: Performed by: EMERGENCY MEDICINE

## 2024-08-14 PROCEDURE — 85610 PROTHROMBIN TIME: CPT | Performed by: EMERGENCY MEDICINE

## 2024-08-14 PROCEDURE — 85379 FIBRIN DEGRADATION QUANT: CPT | Performed by: EMERGENCY MEDICINE

## 2024-08-14 PROCEDURE — 71046 X-RAY EXAM CHEST 2 VIEWS: CPT | Mod: FOREIGN READ | Performed by: RADIOLOGY

## 2024-08-14 PROCEDURE — 99285 EMERGENCY DEPT VISIT HI MDM: CPT

## 2024-08-14 RX ORDER — ONDANSETRON HYDROCHLORIDE 2 MG/ML
4 INJECTION, SOLUTION INTRAVENOUS ONCE
Status: DISCONTINUED | OUTPATIENT
Start: 2024-08-14 | End: 2024-08-14 | Stop reason: HOSPADM

## 2024-08-14 RX ORDER — MORPHINE SULFATE 4 MG/ML
4 INJECTION, SOLUTION INTRAMUSCULAR; INTRAVENOUS ONCE
Status: DISCONTINUED | OUTPATIENT
Start: 2024-08-14 | End: 2024-08-14 | Stop reason: HOSPADM

## 2024-08-14 ASSESSMENT — PAIN SCALES - GENERAL: PAINLEVEL_OUTOF10: 10 - WORST POSSIBLE PAIN

## 2024-08-14 ASSESSMENT — LIFESTYLE VARIABLES
HAVE PEOPLE ANNOYED YOU BY CRITICIZING YOUR DRINKING: NO
EVER FELT BAD OR GUILTY ABOUT YOUR DRINKING: NO
EVER HAD A DRINK FIRST THING IN THE MORNING TO STEADY YOUR NERVES TO GET RID OF A HANGOVER: NO
TOTAL SCORE: 0
HAVE YOU EVER FELT YOU SHOULD CUT DOWN ON YOUR DRINKING: NO

## 2024-08-14 ASSESSMENT — COLUMBIA-SUICIDE SEVERITY RATING SCALE - C-SSRS
2. HAVE YOU ACTUALLY HAD ANY THOUGHTS OF KILLING YOURSELF?: NO
1. IN THE PAST MONTH, HAVE YOU WISHED YOU WERE DEAD OR WISHED YOU COULD GO TO SLEEP AND NOT WAKE UP?: NO
6. HAVE YOU EVER DONE ANYTHING, STARTED TO DO ANYTHING, OR PREPARED TO DO ANYTHING TO END YOUR LIFE?: NO

## 2024-08-14 ASSESSMENT — PAIN DESCRIPTION - ORIENTATION: ORIENTATION: LEFT

## 2024-08-14 ASSESSMENT — PAIN DESCRIPTION - PAIN TYPE: TYPE: ACUTE PAIN

## 2024-08-14 ASSESSMENT — PAIN DESCRIPTION - LOCATION: LOCATION: CHEST

## 2024-08-14 ASSESSMENT — PAIN - FUNCTIONAL ASSESSMENT: PAIN_FUNCTIONAL_ASSESSMENT: 0-10

## 2024-08-14 NOTE — DISCHARGE INSTRUCTIONS
You are seen emergency today for chest pain.  We did perform a comprehensive workup including blood work, EKG, chest x-ray, and CT imaging.  Your troponin, a test that measures how hard you are working, was high, but after speaking with our cardiologist they believe it is due to the recent ablation.  We performed a CTA which is used to identify blood clots in your lungs and it was negative.  At this time, you will be discharged home and recommend to follow with your primary care provider and the cardiologist to perform the ablation.  Please return to the emergency room you have any chest pain, shortness of breath, or coughing up blood.

## 2024-08-14 NOTE — ED TRIAGE NOTES
Pt C/O of Cp x 1 day, pt states he had a ablasion done for his a-fib on Monday. Pt comes in toady with upper left chest pain. Pt has SOB on exertion. No other sx at this time.

## 2024-08-14 NOTE — ED PROVIDER NOTES
HPI   Chief Complaint   Patient presents with   • Chest Pain     Pt C/O of Cp x 1 day, pt states he had a ablasion done for his a-fib on Monday. Pt comes in toady with upper left chest pain. Pt has SOB on exertion. No other sx at this time.       48-year-old male who presents with chest pain.  Patient had an ablation done on Monday at [place] for A-fib.  He is currently on Xarelto.  He states he has had left-sided chest pain since the procedure its gotten worse last night.  States is worse with deep breathing.  Denies any blood clots in his legs or lungs.  Denies any fever, chills, or cough.  Patient denies any history of MI.            Patient History   Past Medical History:   Diagnosis Date   • Arrhythmia      Past Surgical History:   Procedure Laterality Date   • CARDIAC ELECTROPHYSIOLOGY PROCEDURE N/A 8/12/2024    Procedure: Ablation A-Fib;  Surgeon: Ollie Leroy MD;  Location: [facility];  Service: Electrophysiology;  Laterality: N/A;  Labs on admit     No family history on file.  Social History     Tobacco Use   • Smoking status: Never   • Smokeless tobacco: Never   Vaping Use   • Vaping status: Never Used   Substance Use Topics   • Alcohol use: Yes     Comment: rare   • Drug use: Yes     Types: Marijuana     Comment: gummies       Physical Exam   ED Triage Vitals [08/14/24 0705]   Temperature Heart Rate Respirations BP   37.2 °C (99 °F) 66 18 130/77      Pulse Ox Temp src Heart Rate Source Patient Position   99 % -- -- --      BP Location FiO2 (%)     Right arm --       Physical Exam  Constitutional:       Appearance: Normal appearance. He is normal weight.   HENT:      Head: Normocephalic and atraumatic.      Nose: Nose normal.      Mouth/Throat:      Mouth: Mucous membranes are moist.      Pharynx: Oropharynx is clear.   Eyes:      Extraocular Movements: Extraocular movements intact.      Conjunctiva/sclera: Conjunctivae normal.      Pupils: Pupils are equal, round, and reactive to light.    Cardiovascular:      Rate and Rhythm: Normal rate and regular rhythm.   Pulmonary:      Effort: Pulmonary effort is normal.      Breath sounds: Normal breath sounds.   Abdominal:      General: Abdomen is flat. Bowel sounds are normal.      Palpations: Abdomen is soft.   Musculoskeletal:         General: Normal range of motion.      Cervical back: Normal range of motion and neck supple.   Skin:     General: Skin is warm and dry.      Capillary Refill: Capillary refill takes less than 2 seconds.   Neurological:      General: No focal deficit present.      Mental Status: He is alert.   Psychiatric:         Mood and Affect: Mood normal.         Behavior: Behavior normal.         Thought Content: Thought content normal.         Judgment: Judgment normal.         ED Course & MDM   ED Course as of 08/19/24 1340   Wed Aug 14, 2024   1040 XR chest 2 views  IMPRESSION:  No acute findings.   []   1135 Troponin I, High Sensitivity(!!): 784 []   1135 Troponin I, High Sensitivity(!!): 760 []   1222 EKG interpreted by ED physician.  Normal sinus rate rhythm.  No STEMI.  QTc 418.  . [AH]   1346 CT angio chest for pulmonary embolism  IMPRESSION:  No pulmonary artery emboli.  Minimal right basilar atelectasis.  Fatty liver morphology..   []      ED Course User Index  [AH] Melody Garcia, PA-ILIANA         Diagnoses as of 08/19/24 1340   Other chest pain                 No data recorded     [place] Coma Scale Score: 15 (08/14/24 0705 : Vinnie Montemayor, EMT)                           Medical Decision Making  Amount and/or Complexity of Data Reviewed  ECG/medicine tests: independent interpretation performed.     Details: EKG shows a normal sinus rhythm at a rate of 64 with nonspecific ST-T wave changes, interpreted by ED physician.        Procedure  Procedures     Sonja Rodriguez DO  08/19/24 1341

## 2024-09-10 ENCOUNTER — APPOINTMENT (OUTPATIENT)
Dept: CARDIOLOGY | Facility: CLINIC | Age: 49
End: 2024-09-10
Payer: COMMERCIAL

## 2024-09-10 VITALS
HEIGHT: 74 IN | DIASTOLIC BLOOD PRESSURE: 80 MMHG | WEIGHT: 217 LBS | BODY MASS INDEX: 27.85 KG/M2 | HEART RATE: 59 BPM | SYSTOLIC BLOOD PRESSURE: 116 MMHG

## 2024-09-10 DIAGNOSIS — I48.0 PAROXYSMAL ATRIAL FIBRILLATION (MULTI): Primary | ICD-10-CM

## 2024-09-10 DIAGNOSIS — R07.9 CHEST PAIN, UNSPECIFIED TYPE: ICD-10-CM

## 2024-09-10 DIAGNOSIS — R00.2 PALPITATIONS: ICD-10-CM

## 2024-09-10 PROCEDURE — 99214 OFFICE O/P EST MOD 30 MIN: CPT | Performed by: NURSE PRACTITIONER

## 2024-09-10 PROCEDURE — 3008F BODY MASS INDEX DOCD: CPT | Performed by: NURSE PRACTITIONER

## 2024-09-10 PROCEDURE — 93000 ELECTROCARDIOGRAM COMPLETE: CPT | Performed by: INTERNAL MEDICINE

## 2024-09-10 RX ORDER — METOPROLOL SUCCINATE 25 MG/1
TABLET, EXTENDED RELEASE ORAL
Qty: 30 TABLET | Refills: 11 | Status: SHIPPED | OUTPATIENT
Start: 2024-09-10

## 2024-09-10 NOTE — PATIENT INSTRUCTIONS
Check your magnesium tablet:  goal 400mg daily    Start metoprolol 25 mg at bedtime.    Rosa [phone]

## 2024-09-10 NOTE — PROGRESS NOTES
"CARDIOLOGY OFFICE VISIT      CHIEF COMPLAINT  Chief Complaint   Patient presents with    Follow-up     Pt is here today following up after recent hospital visit pertaining to chest pain      Chief complaint: \"My atrial fibrillation's were since the ablation.\"  HISTORY OF PRESENT ILLNESS  HPI  History: The patient is a 48-year-old  male who is referred to Dr. Leroy for evaluation of paroxysmal atrial fibrillation.  The patient reported intermittent palpitations since the 1990s.  He underwent a Holter monitor which revealed a 16 beat run of atrial tachycardia but no true atrial fibrillation.  Patient had monitored his arrhythmia via Fitbit which revealed heart rates up to 169 bpm especially when sleeping.  He has a history of normal left ventricular function and structurally normal heart.  He underwent a PVI on August 12, 2024 and cardioversion and presents the office today for follow-up evaluation.  Review of the medical records reveals patient presented to the emergency room at [facility] on August 14, 2024 with chest pain of 1 days duration.  He also describes shortness of breath with exercise.  Troponins were elevated at 784 and 760, BNP was 201 and D-dimer was 267.  PA and lateral chest x-ray was normal and CTA was negative for PE.  A fatty liver was noted.  No changes were made to the patient's current medications.  The patient states the pain resolved in a day or 2.  He reports now that he is experiencing episodes of palpitations more frequently.  He is monitoring his episodes via his Fitbit as well.  He states that he has prolonged episodes of palpitations approximately every other day.  He denies chest pain, dizziness, lightheadedness, abdominal distention, or lower extremity edema.  He is slightly more easily fatigued and short of breath with exercise.  Past Medical History  Past Medical History:   Diagnosis Date    Arrhythmia        Social History  Social History     Tobacco Use "    Smoking status: Never    Smokeless tobacco: Never   Vaping Use    Vaping status: Never Used   Substance Use Topics    Alcohol use: Yes     Comment: rare    Drug use: Yes     Types: Marijuana     Comment: gummies       Family History     Family History   Problem Relation Name Age of Onset    Heart attack Mother      Obesity Mother          Allergies:  No Known Allergies     Outpatient Medications:  Current Outpatient Medications   Medication Instructions    indomethacin (INDOCIN) 50 mg, oral, As needed    pantoprazole (PROTONIX) 40 mg, oral, 2 times daily before meals, For 6 weeks then discontinue    rivaroxaban (XARELTO) 20 mg, oral, Daily          REVIEW OF SYSTEMS  Review of Systems   All other systems reviewed and are negative.        VITALS  Vitals:    09/10/24 0811   BP: 116/80   Pulse: 59       PHYSICAL EXAM  Vitals and nursing note reviewed.   Constitutional:       Appearance: Normal appearance.   HENT:      Head: Normocephalic.   Neck:      Vascular: No JVD. Carotid upstrokes II/IV.  Cardiovascular:      Rate and Rhythm: Normal rate and regular rhythm.      Pulses: Normal pulses.      Heart sounds: Normal S1 S2, no S3 S4.  No murmurs or rubs.  Pulmonary:      Effort: Pulmonary effort is normal. Respirations regular and nonlabored.     Breath sounds: Clear to auscultation posterior laterally.  Abdominal:      General: Bowel sounds are normal.      Palpations: Abdomen is soft.   Musculoskeletal:         General: Normal range of motion.      Cervical back: Normal range of motion.   Skin:     General: Skin is warm and dry.   Neurological:      General: No focal deficit present.      Mental Status: Alert and oriented to person, place, and time.      Motor: Motor function is intact.   Psychiatric:         Attention and Perception: Attention and perception normal.         Mood and Affect: Mood and affect normal.         Speech: Speech normal.         Behavior: Behavior normal. Behavior is cooperative.          Thought Content: Thought content normal.         Cognition and Memory: Cognition and memory normal.     Labs and testing: Twelve-lead EKG reveals sinus bradycardia at 59 bpm.  QRS duration 104 ms,  ms, QTc 425 ms.  No acute ischemic changes are noted.  2D echocardiogram dated March 15, 2023 revealed an ejection fraction of 55 to 60%, trace to mild PA, trace TR, mild MR, and mild aortic valve and mitral valve thickening.      ASSESSMENT AND PLAN    Clinical impressions:  1.  Paroxysmal atrial fibrillation status post PVI on August 12, 2024 consisting of ablation to the antrum and encircling of the right and left pulmonary veins with placement of a carinal line at the right pulmonary veins, presently on no rate or rhythm controlling medications and anticoagulated with Xarelto.  2.  Paroxysmal atrial tachycardia per Holter monitor and Fitbit.  3.  Normal left ventricular function, ejection fraction 55 to 60% per 2D echocardiogram dated March 15, 2023.  4.  History of gout on Indocin and as needed.  5.  Reported use of cannabis Gummies.  6.  Overweight, BMI 27.86.    Recommendations:  1.  We reviewed the rhythm strips from the Fitbit.  2 arrhythmias were noted: Paroxysmal atrial fibrillation and paroxysmal atrial tachycardia.  The ablation procedure was reviewed in detail.  The patient will begin metoprolol succinate 25 mg 1 tablet daily at bedtime.  He will continue all other medications as prescribed.  2.  Patient was instructed to discontinue use of the cannabis Gummies during the healing process.  3.  Continue lifestyle modifications as discussed.  4.  The patient denies a history of sleep apnea.  Consider sleep study in the future.  I discussed the correlation between sleep apnea and arrhythmia.  Patient has had episodes of arrhythmia which occurred during the nighttime hours.  5.  I will review the above information with Dr. Leroy for any additional recommendations.  6.  Obtain a Zio patch monitor on  November 12, 2024 at 11 AM as scheduled.  7.  Follow-up in office with Dr. Leroy on December 10, 2024 at 11:30 AM as scheduled or sooner if needed.  8.  Patient was provided with my direct phone number for any additional questions or concerns.    Evaluation and note by Rosa Hernandez CNP  **Please excuse any errors in grammar or translation related to this dictation.  Voice recognition software was utilized to prepare this document.**

## 2024-09-24 ENCOUNTER — TELEPHONE (OUTPATIENT)
Dept: CARDIOLOGY | Facility: CLINIC | Age: 49
End: 2024-09-24
Payer: COMMERCIAL

## 2024-09-24 NOTE — TELEPHONE ENCOUNTER
7 day Skip [id number]/[id number] does not require prior auth per Vira CAN at [employer]-call ref#[id number].

## 2024-11-12 ENCOUNTER — APPOINTMENT (OUTPATIENT)
Dept: CARDIOLOGY | Facility: CLINIC | Age: 49
End: 2024-11-12
Payer: COMMERCIAL

## 2024-11-12 DIAGNOSIS — I48.0 PAROXYSMAL ATRIAL FIBRILLATION (MULTI): ICD-10-CM

## 2024-11-26 PROCEDURE — 93248 EXT ECG>7D<15D REV&INTERPJ: CPT | Performed by: INTERNAL MEDICINE

## 2024-12-10 ENCOUNTER — APPOINTMENT (OUTPATIENT)
Dept: CARDIOLOGY | Facility: CLINIC | Age: 49
End: 2024-12-10
Payer: COMMERCIAL

## 2024-12-10 VITALS
DIASTOLIC BLOOD PRESSURE: 70 MMHG | HEIGHT: 74 IN | HEART RATE: 55 BPM | WEIGHT: 219 LBS | TEMPERATURE: 97.7 F | BODY MASS INDEX: 28.11 KG/M2 | SYSTOLIC BLOOD PRESSURE: 108 MMHG | OXYGEN SATURATION: 98 %

## 2024-12-10 DIAGNOSIS — I48.0 PAROXYSMAL ATRIAL FIBRILLATION (MULTI): Primary | ICD-10-CM

## 2024-12-10 PROCEDURE — G2211 COMPLEX E/M VISIT ADD ON: HCPCS | Performed by: INTERNAL MEDICINE

## 2024-12-10 PROCEDURE — 99213 OFFICE O/P EST LOW 20 MIN: CPT | Performed by: INTERNAL MEDICINE

## 2024-12-10 PROCEDURE — 93010 ELECTROCARDIOGRAM REPORT: CPT | Performed by: INTERNAL MEDICINE

## 2024-12-10 PROCEDURE — 3008F BODY MASS INDEX DOCD: CPT | Performed by: INTERNAL MEDICINE

## 2024-12-10 PROCEDURE — 93005 ELECTROCARDIOGRAM TRACING: CPT | Performed by: INTERNAL MEDICINE

## 2024-12-10 PROCEDURE — 1036F TOBACCO NON-USER: CPT | Performed by: INTERNAL MEDICINE

## 2024-12-10 ASSESSMENT — PATIENT HEALTH QUESTIONNAIRE - PHQ9
2. FEELING DOWN, DEPRESSED OR HOPELESS: NOT AT ALL
SUM OF ALL RESPONSES TO PHQ9 QUESTIONS 1 AND 2: 0
1. LITTLE INTEREST OR PLEASURE IN DOING THINGS: NOT AT ALL

## 2024-12-10 NOTE — PROGRESS NOTES
"  Referring Provider: No ref. provider found  Reason for Consult: Atrial fibrillation    History of Present Illness:      Walt Menon is a 48 y.o. year old male patient with a history significant for paroxysmal atrial fibrillation who is referred by Dr. Velez for consideration for A-fib ablation.    He has no other known medical history.  He was originally diagnosed with atrial fibrillation in 2023.  He has been having palpitations since the 90s, however these episodes were always brief and self-limited.  In 2022, he had a Holter monitor that showed up to 16 beats of atrial tachycardia but no true atrial fibrillation.  However, on his Fitbit, he had episodes where his heart rates went up to 169 bpm especially while sleeping.  In March 2023 he had a 2-week event monitor which showed some runs of atrial fibrillation but nothing more than 31 seconds in duration.  These have become progressively more frequent.  More recently, he has been going into atrial fibrillation, which usually last for about 24 hours until it spontaneously terminates.  He is quite symptomatic when he is in atrial fibrillation, noting palpitations, as well as decreased exercise tolerance.  He has noted that some triggers include hot sauce and pasta.  He rarely drinks alcohol, about once a week.  He is not currently on any medication.  He is interested in A-fib ablation.    After his last visit, we performed pulmonary vein isolation in August 2024.  Afterwards, at 3 months post ablation, he had a 7-day patch monitor.  This showed no recurrent atrial fibrillation but did show short episodes of paroxysmal atrial tachycardia, the longest lasting 1 minute.  He notes that he sometimes feels these occasional \"blips\" but they usually only last for a few seconds.  He otherwise feels well since the ablation.  He has stopped as per my recommendation.       Focused Cardiovascular Problem List:  Paroxysmal atrial fibrillation: [id number] equals " "0.  Paroxysmal nonsustained atrial tachycardia      Past Medical and Surgical History:  Mr. Menon  has a past medical history of Arrhythmia.    has a past surgical history that includes Cardiac electrophysiology procedure (N/A, 8/12/2024).    Social History:  Social History     Tobacco Use    Smoking status: Never    Smokeless tobacco: Never   Substance Use Topics    Alcohol use: Yes     Comment: rare      Tobacco: Denies  Alcohol: Social  Drug use:   Denies  Occupational History: Works in IT  Other: Lives at lives at home with children.  .    Relevant Family History:   No relevant family history     Allergies:  No Known Allergies     Medications:  Current Outpatient Medications   Medication Instructions    indomethacin (INDOCIN) 50 mg, oral, As needed    metoprolol succinate XL (Toprol-XL) 25 mg 24 hr tablet Take one tablet daily at bedtime.    pantoprazole (PROTONIX) 40 mg, oral, 2 times daily before meals, For 6 weeks then discontinue    rivaroxaban (XARELTO) 20 mg, oral, Daily         Objective   Physical Exam:  Last Recorded Vitals:      8/12/2024    10:20 AM 8/12/2024    10:25 AM 8/12/2024    10:30 AM 8/12/2024    10:35 AM 8/14/2024     7:05 AM 8/14/2024     2:00 PM 9/10/2024     8:11 AM   Vitals   Systolic 119 118 112 113 130 132 116   Diastolic 68 69 70 69 77 79 80   BP Location     Right arm  Left arm   Heart Rate 68 66 66 66 66 80 59   Temp  36 °C (96.8 °F)   37 °C (98.6 °F) 36.7 °C (98.1 °F)    Resp 14 16 7 22 18 17    Height     1.88 m (6' 2\")  1.88 m (6' 2\")   Weight (lb)     215  217   BMI     27.6 kg/m2  27.86 kg/m2   BSA (m2)     2.26 m2  2.27 m2   Visit Report       Report    Visit Vitals  Smoking Status Never      Gen: NAD, sitting comfortably  HEENT: NC/AT  Card: Irregularly irregular, no murmurs rubs or gallops  Pulm: Clear to auscultation bilaterally  Ext: No LE edema  Neuro: No focal deficits    Diagnostic Results      My Interpretation of Reviewed Study(s):  Prior ECGs " (reviewed and my interpretation):   7/5/2024: Atrial fibrillation, heart rate 99 bpm, aberrantly conducted complexes      Echocardiography:  7/5/2024: Normal LV ejection fraction, normal left atrial size, normal right atrial size      Relevant Labs:  Lab Results   Component Value Date    CREATININE 1.00 08/14/2024    CREATININE 0.91 08/12/2024    K 4.0 08/14/2024    K 3.9 08/12/2024    HGB 15.2 08/14/2024    HGB 15.2 08/12/2024    INR 1.3 (H) 08/14/2024    INR 1.2 (H) 08/12/2024    AST 27 08/14/2024    AST 25 08/12/2024    ALT 29 08/14/2024    ALT 34 08/12/2024       Assessment/Plan   Assessment and Plan:  In summary, patient is a pleasant 48 y.o.male with a history of recurrent symptomatic paroxysmal atrial fibrillation diagnosed in 2023, has been maintained off medication, who presents today for follow-up after pulmonary vein isolation. His 3-month patch monitor showed occasional episodes of paroxysmal atrial tachycardia, the longest lasting one minute, but the majority only lasting for a few seconds.     Recommendations:  - Would continue the low dose metoprolol XL 25mg daily for his occasional short runs of AT  - Stop PPI and Xarelto  - Repeat 7-day patch monitor one year after ablation  - Follow-up next September        Return to Clinic:  in 9 months    Thank you very much for allowing me to participate in the care of this patient. Please do not hesitate to contact me with any further questions or concerns.    Ollie Leroy MD  Clinical Cardiac Electrophysiologist, [facility] [place]    of Medicine, [facility] [employer]  Director of Atrial Fibrillation Ablation, [facility]  Director of Ventricular Arrhythmias Research, [facility]  Office Phone Number: [phone]

## 2024-12-23 DIAGNOSIS — R00.2 PALPITATIONS: ICD-10-CM

## 2024-12-23 DIAGNOSIS — I48.0 PAROXYSMAL ATRIAL FIBRILLATION (MULTI): ICD-10-CM

## 2024-12-23 RX ORDER — METOPROLOL SUCCINATE 25 MG/1
TABLET, EXTENDED RELEASE ORAL
Qty: 90 TABLET | Refills: 3 | Status: SHIPPED | OUTPATIENT
Start: 2024-12-23

## 2024-12-23 NOTE — TELEPHONE ENCOUNTER
Received refill request for metoprolol succinate 50mg daily. Order placed and sent to physician for signature.

## 2025-01-23 NOTE — H&P
History Of Present Illness  48 y.o. year old male patient with a history significant for paroxysmal atrial fibrillation who is referred by Dr. Velez for consideration for A-fib ablation.     He has no other known medical history.  He was originally diagnosed with atrial fibrillation in 2023.  He has been having palpitations since the 90s, however these episodes were always brief and self-limited.  In 2022, he had a Holter monitor that showed up to 16 beats of atrial tachycardia but no true atrial fibrillation.  However, on his Fitbit, he had episodes where his heart rates went up to 169 bpm especially while sleeping.  In March 2023 he had a 2-week event monitor which showed some runs of atrial fibrillation but nothing more than 31 seconds in duration.  These have become progressively more frequent.  More recently, he has been going into atrial fibrillation, which usually last for about 24 hours until it spontaneously terminates.  He is quite symptomatic when he is in atrial fibrillation, noting palpitations, as well as decreased exercise tolerance.  He has noted that some triggers include hot sauce and pasta.  He rarely drinks alcohol, about once a week.  He was started on Xarelto last month after his visit with me.       Past Medical History  Past Medical History:   Diagnosis Date    Arrhythmia        Surgical History  History reviewed. No pertinent surgical history.     Social History  He reports that he has never smoked. He has never used smokeless tobacco. He reports current alcohol use. He reports current drug use. Drug: Marijuana.    Family History  No family history on file.     Allergies  Patient has no known allergies.    Review of Systems   All other systems reviewed and are negative.       Physical Exam  Gen: NAD, sitting comfortably  HEENT: NC/AT  Card: RRR, no m/r/g  Pulm: Normal work of breathing  Ext: No LE edema  Neuro: No focal deficits       Last Recorded Vitals  Blood pressure 131/79, pulse  "64, temperature 36.5 °C (97.7 °F), temperature source Temporal, resp. rate 17, height 1.88 m (6' 2\"), weight 98 kg (216 lb 0.8 oz), SpO2 100%.    Relevant Results        Assessment/Plan   Principal Problem:    Paroxysmal atrial fibrillation (Multi)      Plan to move forward with AF ablation. Same day discharge.        I spent 30 minutes in the professional and overall care of this patient.      Ollie Leroy MD    " Detail Level: Detailed General Sunscreen Counseling: I recommended a broad spectrum sunscreen with a SPF of 30 or higher.  I explained that SPF 30 sunscreens block approximately 97 percent of the sun's harmful rays.  Sunscreens should be applied at least 15 minutes prior to expected sun exposure and then every 2 hours after that as long as sun exposure continues. If swimming or exercising sunscreen should be reapplied every 45 minutes to an hour after getting wet or sweating.  One ounce, or the equivalent of a shot glass full of sunscreen, is adequate to protect the skin not covered by a bathing suit. I also recommended a lip balm with a sunscreen as well. Sun protective clothing can be used in lieu of sunscreen but must be worn the entire time you are exposed to the sun's rays.

## 2025-03-06 DIAGNOSIS — I48.0 PAROXYSMAL ATRIAL FIBRILLATION (MULTI): Primary | ICD-10-CM

## (undated) DEVICE — CATHETER, ELECTROPHYSIOLOGY, SUPREME QUAD, CRD-2 5 MM, 5 FR

## (undated) DEVICE — TUBING SET, IRRIGATION, SMARTABLATE

## (undated) DEVICE — Device

## (undated) DEVICE — SHEATH, PINNACLE, W/.038 GUIDEWIRE, 10 CM,  8FR INTRODUCER, 8FR DIA, 2.5 CM DIALATOR

## (undated) DEVICE — SHEATH, GUIDING, VIZIGO, 8.5F WITH CURVE VIZ  MDC

## (undated) DEVICE — SHEATH, PINNACLE, W/.038 GUIDEWIRE, 10 CM,  9FR INTRODUCER, 9FR DIA, 2.5 CM DIALATOR

## (undated) DEVICE — CATHETER, THERMOCOOL SMART TOUCH, SF, D-F CURVE

## (undated) DEVICE — NEEDLE, TRANSSEPTAL, BRK-1, 18G X 98CM, 50DEG BEVEL

## (undated) DEVICE — PATCHES, EXTERNAL REFERENCE, CARTO3

## (undated) DEVICE — CATHETER, ULTRASOUND, SOUNDSTAR, 8F X 90CM

## (undated) DEVICE — DEVICE, CLOSURE, PERCLOSE, PROSTYLE

## (undated) DEVICE — CATHETHER, CS, BI-DIRECTIONAL, 7FR X 115CM, F-J CURVE, 2MM TIP, 2-8-2 SPACING

## (undated) DEVICE — CATHETER, PENTARAY, NAV ECO, 7FR, 2-6-2 SPACING, D CURVE